# Patient Record
Sex: FEMALE | Race: WHITE | NOT HISPANIC OR LATINO | Employment: OTHER | URBAN - METROPOLITAN AREA
[De-identification: names, ages, dates, MRNs, and addresses within clinical notes are randomized per-mention and may not be internally consistent; named-entity substitution may affect disease eponyms.]

---

## 2024-07-18 PROBLEM — F32.A DEPRESSION: Status: ACTIVE | Noted: 2024-07-18

## 2024-07-18 PROBLEM — L21.0 DANDRUFF: Status: ACTIVE | Noted: 2024-07-18

## 2024-07-18 PROBLEM — G89.29 CHRONIC LOW BACK PAIN: Status: ACTIVE | Noted: 2024-07-18

## 2024-07-18 PROBLEM — E11.9 DIABETES MELLITUS TYPE 2, CONTROLLED (MULTI): Status: ACTIVE | Noted: 2024-07-18

## 2024-07-18 PROBLEM — M54.50 CHRONIC LOW BACK PAIN: Status: ACTIVE | Noted: 2024-07-18

## 2024-07-18 PROBLEM — E55.9 VITAMIN D DEFICIENCY: Status: ACTIVE | Noted: 2024-07-18

## 2024-07-18 PROBLEM — N64.89 PSEUDOANGIOMATOUS STROMAL HYPERPLASIA OF BREAST: Status: ACTIVE | Noted: 2024-07-18

## 2024-07-18 PROBLEM — R10.13 DYSPEPSIA: Status: ACTIVE | Noted: 2024-07-18

## 2024-07-18 PROBLEM — E78.5 HYPERLIPIDEMIA: Status: ACTIVE | Noted: 2024-07-18

## 2024-07-18 PROBLEM — M25.512 CHRONIC LEFT SHOULDER PAIN: Status: ACTIVE | Noted: 2024-07-18

## 2024-07-18 PROBLEM — M47.816 LUMBAR SPONDYLOSIS: Status: ACTIVE | Noted: 2024-07-18

## 2024-07-18 PROBLEM — R06.09 DOE (DYSPNEA ON EXERTION): Status: ACTIVE | Noted: 2024-07-18

## 2024-07-18 PROBLEM — R53.83 FATIGUE: Status: ACTIVE | Noted: 2024-07-18

## 2024-07-18 PROBLEM — M17.0 PRIMARY OSTEOARTHRITIS OF BOTH KNEES: Status: ACTIVE | Noted: 2024-07-18

## 2024-07-18 PROBLEM — R79.89 LOW VITAMIN B12 LEVEL: Status: ACTIVE | Noted: 2024-07-18

## 2024-07-18 PROBLEM — M79.671 PAIN OF RIGHT HEEL: Status: RESOLVED | Noted: 2018-12-13 | Resolved: 2024-07-18

## 2024-07-18 PROBLEM — M19.90 ARTHRITIS: Status: ACTIVE | Noted: 2024-07-18

## 2024-07-18 PROBLEM — Z98.84 S/P GASTRIC BYPASS: Status: RESOLVED | Noted: 2024-07-18 | Resolved: 2024-07-18

## 2024-07-18 PROBLEM — E66.01 MORBID OBESITY (MULTI): Status: ACTIVE | Noted: 2024-07-18

## 2024-07-18 PROBLEM — G89.29 CHRONIC LEFT SHOULDER PAIN: Status: ACTIVE | Noted: 2024-07-18

## 2024-07-18 RX ORDER — ATORVASTATIN CALCIUM 80 MG/1
1 TABLET, FILM COATED ORAL DAILY
COMMUNITY
Start: 2016-07-20

## 2024-07-18 RX ORDER — BLOOD-GLUCOSE METER
EACH MISCELLANEOUS
COMMUNITY
Start: 2017-02-14

## 2024-07-18 RX ORDER — HYDROCHLOROTHIAZIDE 25 MG/1
1 TABLET ORAL DAILY
COMMUNITY
Start: 2016-04-09

## 2024-07-18 RX ORDER — FERROUS SULFATE 325(65) MG
1 TABLET ORAL DAILY
COMMUNITY
Start: 2016-11-30

## 2024-07-18 RX ORDER — LANOLIN ALCOHOL/MO/W.PET/CERES
1 CREAM (GRAM) TOPICAL DAILY
COMMUNITY
Start: 2021-11-16

## 2024-07-18 RX ORDER — KETOCONAZOLE 20 MG/ML
SHAMPOO, SUSPENSION TOPICAL
COMMUNITY
Start: 2022-07-29

## 2024-07-18 RX ORDER — OMEPRAZOLE 20 MG/1
1 CAPSULE, DELAYED RELEASE ORAL DAILY
COMMUNITY
Start: 2021-03-04

## 2024-07-18 RX ORDER — GLIPIZIDE 10 MG/1
1 TABLET ORAL 2 TIMES DAILY
COMMUNITY
Start: 2016-04-09

## 2024-07-18 RX ORDER — FLUOXETINE HYDROCHLORIDE 20 MG/1
CAPSULE ORAL
COMMUNITY
Start: 2016-07-17

## 2024-07-18 RX ORDER — ACETAMINOPHEN 500 MG
1 TABLET ORAL DAILY
COMMUNITY
Start: 2022-04-14

## 2024-07-18 RX ORDER — METFORMIN HYDROCHLORIDE 1000 MG/1
1 TABLET ORAL 2 TIMES DAILY
COMMUNITY
Start: 2016-04-09

## 2024-07-18 RX ORDER — DULAGLUTIDE 3 MG/.5ML
INJECTION, SOLUTION SUBCUTANEOUS
COMMUNITY
Start: 2020-11-02

## 2024-07-18 RX ORDER — DAPAGLIFLOZIN 10 MG/1
1 TABLET, FILM COATED ORAL DAILY
COMMUNITY
Start: 2017-12-07

## 2024-07-18 RX ORDER — ALBUTEROL SULFATE 90 UG/1
AEROSOL, METERED RESPIRATORY (INHALATION)
COMMUNITY
Start: 2016-07-20

## 2024-07-18 RX ORDER — LISINOPRIL 20 MG/1
1 TABLET ORAL DAILY
COMMUNITY
Start: 2016-07-17

## 2024-08-20 ENCOUNTER — OFFICE VISIT (OUTPATIENT)
Dept: NEUROLOGY | Facility: CLINIC | Age: 64
End: 2024-08-20
Payer: COMMERCIAL

## 2024-08-20 ENCOUNTER — APPOINTMENT (OUTPATIENT)
Dept: GERIATRIC MEDICINE | Facility: CLINIC | Age: 64
End: 2024-08-20
Payer: COMMERCIAL

## 2024-08-20 VITALS
TEMPERATURE: 99 F | HEART RATE: 71 BPM | WEIGHT: 278.6 LBS | DIASTOLIC BLOOD PRESSURE: 90 MMHG | BODY MASS INDEX: 47.82 KG/M2 | RESPIRATION RATE: 20 BRPM | SYSTOLIC BLOOD PRESSURE: 165 MMHG

## 2024-08-20 DIAGNOSIS — F01.518 VASCULAR DEMENTIA WITH OTHER BEHAVIORAL DISTURBANCE, UNSPECIFIED DEMENTIA SEVERITY (MULTI): Primary | ICD-10-CM

## 2024-08-20 DIAGNOSIS — I67.9 CEREBROVASCULAR DISEASE: ICD-10-CM

## 2024-08-20 PROCEDURE — 99205 OFFICE O/P NEW HI 60 MIN: CPT | Performed by: PSYCHIATRY & NEUROLOGY

## 2024-08-20 PROCEDURE — 4010F ACE/ARB THERAPY RXD/TAKEN: CPT | Performed by: PSYCHIATRY & NEUROLOGY

## 2024-08-20 PROCEDURE — 3077F SYST BP >= 140 MM HG: CPT | Performed by: PSYCHIATRY & NEUROLOGY

## 2024-08-20 PROCEDURE — 3080F DIAST BP >= 90 MM HG: CPT | Performed by: PSYCHIATRY & NEUROLOGY

## 2024-08-20 PROCEDURE — 1036F TOBACCO NON-USER: CPT | Performed by: PSYCHIATRY & NEUROLOGY

## 2024-08-20 PROCEDURE — 99215 OFFICE O/P EST HI 40 MIN: CPT | Performed by: PSYCHIATRY & NEUROLOGY

## 2024-08-20 RX ORDER — FLUOXETINE HYDROCHLORIDE 20 MG/1
20 CAPSULE ORAL ONCE
Status: SHIPPED | OUTPATIENT
Start: 2024-08-20

## 2024-08-20 ASSESSMENT — ACTIVITIES OF DAILY LIVING (ADL)
STILL_DRIVING: NO
GROCERY_SHOPPING: INDEPENDENT
USING_TRANSPORTATION: INDEPENDENT
USING_TELEPHONE: NEEDS ASSISTANCE
TAKING_MEDICATION: INDEPENDENT
PILL_BOX_USED: YES
PREPARING_MEALS: INDEPENDENT
NEEDS_ASSISTANCE_WITH_FOOD: INDEPENDENT
EATING: INDEPENDENT
DOING_HOUSEWORK: INDEPENDENT
MANAGING_FINANCES: NEEDS ASSISTANCE
BATHING_ASSISTANCE: MINIMAL

## 2024-08-20 ASSESSMENT — MINI MENTAL STATE EXAM
PLACE DESIGN, ERASER AND PENCIL IN FRONT OF THE PERSON.  SAY:  COPY THIS DESIGN PLEASE.  SHOW: DESIGN. ALLOW: MULTIPLE TRIES. WAIT UNTIL PERSON IS FINISHED AND HANDS IT BACK. SCORE: ONLY FOR DIAGRAM WITH 4-SIDED FIGURE BETWEEN TWO 5-SIDED FIGURES: 1 CORRECT
RECALL THE 3 OBJECTS FROM ABOVE (APPLE, TABLE, PENNY) OR (BALL, TREE, FLAG): 0 CORRECT / UNABLE TO SCORE
SPELL THE WORD WORLD FORWARD AND BACKWARDS OR SERIAL 7S: 2 CORRECT
NAME OR REPEAT 3 OBJECTS - (APPLE, TABLE, PENNY) OR (BALL, TREE, FLAG): 3 CORRECT
SAY:  READ THE WORDS ON THE PAGE AND THEN DO WHAT IT SAYS.  THEN HAND THE PERSON THE SHEET WITH CLOSE YOUR EYES ON IT.  IF THE SUBJECT READS AND DOES NOT CLOSE THEIR EYES, REPEAT UP TO THREE TIMES.  SCORE ONLY IF SUBJECT CLOSES EYES.: 3 CORRECT
WHAT IS THE YEAR, SEASON, DATE, DAY, AND MONTH: 3 CORRECT
WHAT STATE, COUNTRY, CITY, HOSPITAL, FLOOR: 4 CORRECT
HAND THE PERSON A PENCIL AND PAPER. SAY:  WRITE ANY COMPLETE SENTENCE ON THAT PIECE OF PAPER. (NOTE: THE SENTENCE MUST MAKE SENSE.  IGNORE SPELLING ERRORS): 1 CORRECT
SUM ALL MMSE QUESTIONS FOR TOTAL SCORE [OUT OF 30].: 20
SAY: I WOULD LIKE YOU TO REPEAT THIS PHRASE AFTER ME: NO IFS, ANDS, OR BUTS.: 1 CORRECT
SHOW: PENCIL [OBJECT] ASK: WHAT IS THIS CALLED?: 2 CORRECT
PLEASE COPY THIS PICTURE (NOTE ALL 10 ANGLES MUST BE PRESENT AND TWO MUST INTERSECT): 0 CORRECT

## 2024-08-20 ASSESSMENT — GERIATRIC DEPRESSION SCALE SHORT VERSION (GDS-SV)
DO YOU OFTEN GET BORED: NO
DO YOU FEEL YOU HAVE MORE PROBLEMS WITH MEMORY THAN MOST: YES
ARE YOU BASICALLY SATISFIED WITH YOUR LIFE: NO
DO YOU PREFER TO STAY AT HOME, RATHER THAN GOING OUT AND DOING NEW THINGS: NO
HAVE YOU DROPPED MANY OF YOUR ACTIVITIES AND INTERESTS?: YES
DO YOU FEEL THAT YOUR SITUATION IS HOPELESS: YES
GDS TOTAL SCORE: 6
DO YOU FEEL FULL OF ENERGY: NO
DO YOU OFTEN FEEL HELPLESS: NO
DO YOU FEEL THAT YOUR LIFE IS EMPTY: NO
ARE YOU IN GOOD SPIRITS MOST OF THE TIME: YES
DO YOU THINK THAT MOST PEOPLE ARE BETTER OFF THAN YOU ARE: NO
ARE YOU AFRAID THAT SOMETHING BAD IS GOING TO HAPPEN TO YOU: NO
DO YOU FEEL PRETTY WORTHLESS THE WAY YOU ARE NOW: NO
DO YOU FEEL HAPPY MOST OF THE TIME: NO
DO YOU THINK IT IS WONDERFUL TO BE ALIVE NOW: YES

## 2024-08-20 ASSESSMENT — ENCOUNTER SYMPTOMS
LOSS OF SENSATION IN FEET: 0
DEPRESSION: 0
OCCASIONAL FEELINGS OF UNSTEADINESS: 0

## 2024-08-20 ASSESSMENT — PATIENT HEALTH QUESTIONNAIRE - PHQ9
2. FEELING DOWN, DEPRESSED OR HOPELESS: NOT AT ALL
1. LITTLE INTEREST OR PLEASURE IN DOING THINGS: NOT AT ALL
SUM OF ALL RESPONSES TO PHQ9 QUESTIONS 1 AND 2: 0

## 2024-08-20 ASSESSMENT — PAIN SCALES - GENERAL: PAINLEVEL: 0-NO PAIN

## 2024-08-20 NOTE — PROGRESS NOTES
Patient ID: Doreen Napier is a 64 y.o. female who presents for cognitive impairment, sister made pt's appointment.    Identifying Information                              : 1960           Assessment date: 2024    Patient accompanied by:  Sister Phylicia         History provided by: Patient and sister    Symptoms Reported (include length of time): For the past couple years, pt has been losing a lot of her words. Pt's confused in 2024, pt went to the ER in 2024 and found out that she had a UTI. She is forgetting how to use her phone, trouble remembering what is being told to her and has to write everything down. A couple months after alf, the memory impairment started. Had gastric bypass >10 years ago.     CONCERNS IDENTIFIED BY NURSING AND SOCIAL WORK (Safety Risks, Health Issues, POA not in Chart, etc)     1. Pt had a UTI for months without knowing it. Has urinary urgency.     2. Has a CPAP but does not use it.     3. Still driving      4. Sister has to remind pt when to bathe.     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      nursing home date (if applicable): retired , no memory issues prior to alf     On any form of disability? no If so, explain:   Marital Status:  single                       Social History     Socioeconomic History    Marital status: Single   Tobacco Use    Smoking status: Never    Smokeless tobacco: Never        ENCOUNTER SCREENING RESULTS     MMSE (Mini Mental State Exam)  What is the Year, Season, Date, Day, and Month?: 3 correct  Where are we: State, Country, City, Hospital, Floor?: 4 correct  Name / Repeat 3 objects:( Apple, Table, Mila) or (Ball, Tree, Flag) : 3 correct  Serial 7's backwards or spell World backwards?: 2 correct (could not do the serial sevens, got 2 letters correct spelling world backwards)  Recall the 3 objects from above  "(apple, table, daniel) or (ball, tree, flag): 0 correct / Unable to score  Name the following: pencil, watch: 2 correct  Repeat the following: \"No ifs, ands, or buts.\": 1 correct  Follow these commands: Take a paper in your right hand, fold it in half, and put it on the floor.: 3 correct  \"Please read this and do what it says\" (Written instruction is \"Close your eyes.\"): 1 correct  Written instruction is \"Make up and write a sentence about anything.\" (This sentence must contain a noun and a verb.): 1 correct  Written instruction is: \"Please copy this picture.\" (All 10 angles must be present and two must intersect.): 0 correct  MMSE Total Score:: 20    Geriatric Depression Scale (Short Version) Total: 6    NURSING ASSESSMENT          IADL's  Using Telephone: Needs assistance (sometimes has trouble with remembering what the buttons are for, retrieving messages, navigating the phone)  Grocery Shopping: Independent  Preparing Meals: Independent  Doing Housework: Independent  Laundry: Independent  Taking Medication: Independent  Pill Box Used: Yes  Managing Finances: Needs assistance (has a history of forgetting about paying bills, sister took over, now bills are automatically withdrawn.)  Using Transportation: Independent  Still Driving: No (knows what areas look like, but does not know the name of streets, drives locally)  Eating: Independent  Needs Assistance With Food: Independent  Difficulty Chewing or Swallowing: No     Safety Concerns  Safety Concerns: None     Nutrition and Exercise  Current Diet: Well Balanced Diet  Adequate Fluid Intake: Yes  Caffeine: Yes (24 oz cup of coke a day)  Appetite:: Good  Food Consistency:: Regular (had gastric bypass, watches sugar intake)  Liquids Consistency:: Thin  Changes in Weight?: Yes (lost 30 pounds while on Trulicity in 2022)  Chewing or Swallowing Problems?: No  Exercise Frequency: Regularly     SOCIAL WORK ASSESSMENT    Advance Directives/Legal/Financial    Patient " Healthcare POA:  no         Is copy of Healthcare POA in chart:  no       DPOA for finances:  no        Legal guardian:  NA     Living Will: no     ? no  If so, explain:     Significant financial stressors:     Family History      Biological Mother(status, age at death, cause of death): Mom  in  at age 82, from Alzheimer's, but  from not recovering from fall.      Biological Father(status, age at death, cause of death): Dad  at age 68 from complications from heart and blood sugar problems.      Biological Siblings(status, age at death, cause of death): 2 sisters both living     Extended family members with relevant health history:     Living Situation      Type of residence:     People living in the home:    Supportive Relationships (Informal Support)     Spouse/partner information (include length of relationship, health status):      Children Information (include location, level of engagement): none      Other social supports:    Formal Supports     Engaged community services (Emergency Alert, HHA, MOW, Case Management, Etc.): NA    Mental Health     Sleep: sleeps good, goes to bed at 9pm-11pm, wakes up at 7am-10am, has a CPAP but does not use it.      Energy: tires out throughout the day, takes naps     Mood: within normal limits     Affect: calm and pleasant     Notable Loss/Grief: NA     Self-harm/suicidal thoughts, plan: None Reported     Interests/Hobbies/Activities/Daily Routine: goes to the library, visits with friends, Taoist activities, takes a friend to run errands     History of outpatient psychiatric services: NA     History of inpatient psychiatric services (hospitalization): NA     Medications for mental health past or present: NA     History of addiction services: NA    Additional Nursing Notes or Follow-up Matters: NA    Additional Social Work Notes or Follow-up Matters: NA

## 2024-08-26 ENCOUNTER — TELEPHONE (OUTPATIENT)
Dept: NEUROLOGY | Facility: CLINIC | Age: 64
End: 2024-08-26

## 2024-08-26 DIAGNOSIS — I67.9 CEREBROVASCULAR DISEASE: ICD-10-CM

## 2024-08-26 DIAGNOSIS — F01.518 VASCULAR DEMENTIA WITH OTHER BEHAVIORAL DISTURBANCE, UNSPECIFIED DEMENTIA SEVERITY: ICD-10-CM

## 2024-08-26 RX ORDER — FLUOXETINE HYDROCHLORIDE 20 MG/1
20 CAPSULE ORAL DAILY
Qty: 90 CAPSULE | Refills: 3 | OUTPATIENT
Start: 2024-08-26 | End: 2025-08-26

## 2024-09-10 ENCOUNTER — TELEPHONE (OUTPATIENT)
Dept: NEUROLOGY | Facility: CLINIC | Age: 64
End: 2024-09-10
Payer: COMMERCIAL

## 2024-09-10 DIAGNOSIS — F32.A DEPRESSION, UNSPECIFIED DEPRESSION TYPE: Primary | ICD-10-CM

## 2024-09-10 RX ORDER — FLUOXETINE HYDROCHLORIDE 20 MG/1
20 CAPSULE ORAL DAILY
Qty: 90 CAPSULE | Refills: 3 | Status: SHIPPED | OUTPATIENT
Start: 2024-09-10 | End: 2025-09-10

## 2025-01-21 ENCOUNTER — APPOINTMENT (OUTPATIENT)
Dept: NEUROLOGY | Facility: CLINIC | Age: 65
End: 2025-01-21
Payer: COMMERCIAL

## 2025-01-29 ENCOUNTER — OFFICE VISIT (OUTPATIENT)
Dept: NEUROLOGY | Facility: CLINIC | Age: 65
End: 2025-01-29
Payer: COMMERCIAL

## 2025-01-29 VITALS
RESPIRATION RATE: 18 BRPM | DIASTOLIC BLOOD PRESSURE: 75 MMHG | WEIGHT: 273 LBS | HEART RATE: 69 BPM | SYSTOLIC BLOOD PRESSURE: 148 MMHG | BODY MASS INDEX: 46.86 KG/M2

## 2025-01-29 DIAGNOSIS — F01.518 VASCULAR DEMENTIA WITH OTHER BEHAVIORAL DISTURBANCE, UNSPECIFIED DEMENTIA SEVERITY: ICD-10-CM

## 2025-01-29 DIAGNOSIS — F02.80 LATE ONSET ALZHEIMER'S DISEASE WITHOUT BEHAVIORAL DISTURBANCE (MULTI): Primary | ICD-10-CM

## 2025-01-29 DIAGNOSIS — G30.1 LATE ONSET ALZHEIMER'S DISEASE WITHOUT BEHAVIORAL DISTURBANCE (MULTI): Primary | ICD-10-CM

## 2025-01-29 PROCEDURE — 3077F SYST BP >= 140 MM HG: CPT | Performed by: PSYCHIATRY & NEUROLOGY

## 2025-01-29 PROCEDURE — 4010F ACE/ARB THERAPY RXD/TAKEN: CPT | Performed by: PSYCHIATRY & NEUROLOGY

## 2025-01-29 PROCEDURE — 99215 OFFICE O/P EST HI 40 MIN: CPT | Performed by: PSYCHIATRY & NEUROLOGY

## 2025-01-29 PROCEDURE — 3078F DIAST BP <80 MM HG: CPT | Performed by: PSYCHIATRY & NEUROLOGY

## 2025-01-29 RX ORDER — LOSARTAN POTASSIUM 25 MG/1
1 TABLET ORAL
COMMUNITY
Start: 2025-01-06

## 2025-01-29 RX ORDER — MEMANTINE HYDROCHLORIDE 10 MG/1
10 TABLET ORAL 2 TIMES DAILY
Qty: 180 TABLET | Refills: 3 | Status: SHIPPED | OUTPATIENT
Start: 2025-01-29 | End: 2026-01-29

## 2025-01-29 ASSESSMENT — PAIN SCALES - GENERAL: PAINLEVEL_OUTOF10: 0-NO PAIN

## 2025-01-29 NOTE — PROGRESS NOTES
[place], [place]      Department of Neurology  [facility]     [facility] Consultation    PCP:  Dr. Nikolas Cobb        2025  Re: Doreen Napier     : 1960  MRN [id number]    CC: 63yo woman referred for the evaluation of mild cognitive impairment.  Info from pt, her sister Phylicia, and the EMR.  HPI: :  Pt noticed difficulty finding words and memory impairment, not directly prompting her early prison.  :  Forgetting how to do everyday tasks around the house.  : Worsened in March and ER eval dxd UTI.  UTI was treated but memory did not improve.  Lives alone in apartment, recently cleaned by family.  Sister reminds her to take baths.  Pt continues to drive, remembers what streets look like but can't remember the names.  She understands what others say, but can't think of what she wants to say.  Symptoms worsened during UTI, EW dx. :  Pts' sister tells me that pt is having trouble remembering the appt schedule.  She renewed her 's license, passed the test and was re-issued license. UTI in  worsened word-finding.  This appt was occupied explaining that we need the MRI done at a  hosp ([facility] main hosp MRI) and the amyloid PET scan and then why it was never sent to  radiology for posting in our system as they had specifically requested at [facility].  Med Hx  Allergies: ASA, NSAIDs, both noted in the course or consequence, of gastric bypass.  Rx:   lhtafhflrx71 (shifted from AM to hs), hctz25, aeazjpxstj10, oaubnmp41, felllfzes2spWT,   pahteomwt97yux, tolvbelwf5yoyk, ajbclzrw32IU,    albuterol inh, FeSO4, B12, D3, ketoconazole shampoo, cataracts,  CPAP  H/O: anx-depress, trigem neural, HTN, HLD, MELTON, DM2, arthritis, glaucoma, skin neoplasia, RADHA  S/P: gastric bypass, recently Rxd trulicity and lost another 25 lbs  FHx: Dad DM d68; Mom d82 AD,  2 sis , none kids  PSHx: Marital: single, in [facility], college grad, [employer], still drives, sleep wnl  EtOH:  none Smoking: none      Subs Abuse: none       Physical: MO=731/75, HR=69, RR=18,   prev ls=178  Neuro MS: Affect: a bit anxious during discussion    Otherwise wnl range       no hallucins, delusions, or agitation  CN:  FEOM  w/o nystagmus or ptosis   Motor: strong w/o UE  wnl tone  I did not see a tremor today    Gait: nl pace, stride, armswing  wnl turning in 3.0 steps   Cog Exam: Language:  RH, recept: answers & follows wnl      Prev Testing MoCA=20/30, c/w mild-mod impairment   AD8=6/7  Labs (to 2-):  wbc=5.8,    hct=42.9,    cwg=910  A1c=7.5à6.8  mwa=222, bun/crt=20/0.89,  GFR=73,     AST=19  ALT=24  AlkP=75   bzcb=763, hdl=40.3, wdqXLO=592, kdu=453, chol/hdl=5.5, vldl=45, JU=730,  Xdsjreew=272     TSH=     fT4=   B1=     E69=072,  Fol=,  D3=26    Vj=395, %sat=32  EKG (7-):  NSR=70     LCk=129   Abnls:    Brain MRI (pend from 8-):  A&P: Hx: 2y declineàretired.  Genl exam: chronic over-weight.  Neuro exam: LE sensory loss.  Cog exam some struggle for words, other times fast/fluent/articulate.  Prev MoCA=20/30 c/w mod impair.  AD8=6/7 c/w some difficulties at home, as sister describes.  Labs available are unremarkable.  Interpret: Pt with two years of slowly progressive difficulty in naming common objects, carrying-on a conversation and saying words that she knows or usually knows. Her irregular responses and clearly stressed demeanor suggest that a mood disorder might play a role, regla in the context of her initially impending, and not recent, nursing home undermining the structure of her activities. Plan: I again ordered a brain MRI w/o contract to evaluate for vascular lesions, atrophy or other causes of cognitive decline.  She has been taking moxtdwkxoc24 w/o SEs and some benefit, increased dose to 20 mg/d in the hope of boosting her activity but it was not tolerated and dose reduced back to 10hs.  F/U:  I've again ordered the Brain MRI and added an  Amyloid PET scan and ApoE genotyping for AD dx and consider AD Mab tx.  They asked questions and showed good understanding.  I will RTC 2mons.   Thank you for allowing me to participate in your care.   Sincerely yours, Allen Espitia M.D., Ph.D.

## 2025-02-18 ENCOUNTER — HOSPITAL ENCOUNTER (OUTPATIENT)
Dept: RADIOLOGY | Facility: HOSPITAL | Age: 65
Discharge: HOME | End: 2025-02-18
Payer: COMMERCIAL

## 2025-02-18 DIAGNOSIS — F02.80 LATE ONSET ALZHEIMER'S DISEASE WITHOUT BEHAVIORAL DISTURBANCE (MULTI): ICD-10-CM

## 2025-02-18 DIAGNOSIS — I67.9 CEREBROVASCULAR DISEASE: ICD-10-CM

## 2025-02-18 DIAGNOSIS — G30.1 LATE ONSET ALZHEIMER'S DISEASE WITHOUT BEHAVIORAL DISTURBANCE (MULTI): ICD-10-CM

## 2025-02-18 PROCEDURE — A9586 FLORBETAPIR F18: HCPCS | Performed by: PSYCHIATRY & NEUROLOGY

## 2025-02-18 PROCEDURE — 3430000001 HC RX 343 DIAGNOSTIC RADIOPHARMACEUTICALS: Performed by: PSYCHIATRY & NEUROLOGY

## 2025-02-18 PROCEDURE — 70551 MRI BRAIN STEM W/O DYE: CPT

## 2025-02-18 PROCEDURE — 70551 MRI BRAIN STEM W/O DYE: CPT | Performed by: RADIOLOGY

## 2025-02-18 PROCEDURE — 78814 PET IMAGE W/CT LMTD: CPT | Mod: PI

## 2025-02-18 RX ADMIN — FLORBETAPIR F 18 11.47 MILLICURIE: 51 INJECTION, SOLUTION INTRAVENOUS at 13:15

## 2025-02-24 ENCOUNTER — APPOINTMENT (OUTPATIENT)
Dept: NEUROLOGY | Facility: CLINIC | Age: 65
End: 2025-02-24
Payer: COMMERCIAL

## 2025-06-09 ENCOUNTER — APPOINTMENT (OUTPATIENT)
Dept: NEUROLOGY | Facility: CLINIC | Age: 65
End: 2025-06-09
Payer: COMMERCIAL

## 2025-06-09 ENCOUNTER — HOME HEALTH ADMISSION (OUTPATIENT)
Dept: HOME HEALTH SERVICES | Facility: HOME HEALTH | Age: 65
End: 2025-06-09
Payer: COMMERCIAL

## 2025-06-09 VITALS
WEIGHT: 271.4 LBS | TEMPERATURE: 97.3 F | DIASTOLIC BLOOD PRESSURE: 92 MMHG | BODY MASS INDEX: 46.33 KG/M2 | HEIGHT: 64 IN | HEART RATE: 78 BPM | SYSTOLIC BLOOD PRESSURE: 140 MMHG | RESPIRATION RATE: 16 BRPM

## 2025-06-09 DIAGNOSIS — R26.81 UNSTEADY GAIT: ICD-10-CM

## 2025-06-09 DIAGNOSIS — E11.10: ICD-10-CM

## 2025-06-09 DIAGNOSIS — G23.8 OTHER SPECIFIED DEGENERATIVE DISEASES OF BASAL GANGLIA: ICD-10-CM

## 2025-06-09 DIAGNOSIS — R41.0 CONFUSION: Primary | ICD-10-CM

## 2025-06-09 DIAGNOSIS — G30.1 LATE ONSET ALZHEIMER'S DISEASE WITHOUT BEHAVIORAL DISTURBANCE (MULTI): ICD-10-CM

## 2025-06-09 DIAGNOSIS — F02.80 LATE ONSET ALZHEIMER'S DISEASE WITHOUT BEHAVIORAL DISTURBANCE (MULTI): ICD-10-CM

## 2025-06-09 PROCEDURE — 99205 OFFICE O/P NEW HI 60 MIN: CPT | Performed by: PSYCHIATRY & NEUROLOGY

## 2025-06-09 PROCEDURE — 3008F BODY MASS INDEX DOCD: CPT | Performed by: PSYCHIATRY & NEUROLOGY

## 2025-06-09 PROCEDURE — 3077F SYST BP >= 140 MM HG: CPT | Performed by: PSYCHIATRY & NEUROLOGY

## 2025-06-09 PROCEDURE — 4010F ACE/ARB THERAPY RXD/TAKEN: CPT | Performed by: PSYCHIATRY & NEUROLOGY

## 2025-06-09 PROCEDURE — 1159F MED LIST DOCD IN RCRD: CPT | Performed by: PSYCHIATRY & NEUROLOGY

## 2025-06-09 PROCEDURE — 1160F RVW MEDS BY RX/DR IN RCRD: CPT | Performed by: PSYCHIATRY & NEUROLOGY

## 2025-06-09 PROCEDURE — G8433 SCR FOR DEP NOT CPT DOC RSN: HCPCS | Performed by: PSYCHIATRY & NEUROLOGY

## 2025-06-09 PROCEDURE — 3080F DIAST BP >= 90 MM HG: CPT | Performed by: PSYCHIATRY & NEUROLOGY

## 2025-06-09 RX ORDER — MEMANTINE HYDROCHLORIDE 10 MG/1
10 TABLET ORAL 2 TIMES DAILY
Qty: 180 TABLET | Refills: 3 | Status: SHIPPED | OUTPATIENT
Start: 2025-06-09 | End: 2026-06-09

## 2025-06-09 ASSESSMENT — ENCOUNTER SYMPTOMS
CONFUSION: 1
OCCASIONAL FEELINGS OF UNSTEADINESS: 0
LOSS OF SENSATION IN FEET: 0
DEPRESSION: 0

## 2025-06-09 NOTE — PATIENT INSTRUCTIONS
Plan: The patient does not meet criteria for treatment with either the Alzheimer's infusions at this time as her Mini-Mental status examination score was 9.  It is certainly possible that it is lower than just this past winter as she may have a urinary tract infection.  The patient needs a urinalysis and a urine culture.  The patient needs a CMP and CBC.  The patient needs a workup for the reversible cause of dementia.  The patient does need an Occupational Therapy evaluation for driving evaluation.  The patient needs a home health evaluation as well as a physical and Occupational Therapy consult.  The patient should continue all of her medicines and stroke risk factor modification.  The patient's sister does need to get hardin of  for finances and the patient needs a living well.  The patient's blood pressure is mildly elevated and she needs to follow-up with the primary care doctor for this.  I discussed all these issues in detail with the patient and her sister and answered all the questions.  The patient will follow up with the NP in 6 months.        Ways to Help Prevent Falls at Home    Quick Tips   ? Ask for help if you need it. Most people want to help!   ? Get up slowly after sitting or laying down   ? Wear a medical alert device or keep cell phone in your pocket   ? Use night lights, especially areas near a bathroom   ? Keep the items you use often within reach on a small stool or end table   ? Use an assistive device such as walker or cane, as directed by provider/physical therapy   ? Use a non-slip mat and grab bars in your bathroom. Look for home health sections for best options     Other Areas to Focus On   ? Exercise and nutrition: Regular exercise or taking a falls prevention class are great ways improve strength and balance. Don’t forget to stay hydrated and bring a snack!   ? Medicine side effects: Some medicines can make you sleepy or dizzy, which could cause a fall. Ask your healthcare  provider about the side effects your medicines could cause. Be sure to let them know if you take any vitamins or supplements as well.   ? Tripping hazards: Remove items you could trip on, such as loose mats, rugs, cords, and clutter. Wear closed toe shoes with rubber soles.   ? Health and wellness: Get regular checkups with your healthcare provider, plus routine vision and hearing screenings. Talk with your healthcare provider about:   o Your medicines and the possible side effects - bring them in a bag if that is easier!   o Problems with balance or feeling dizzy   o Ways to promote bone health, such as Vitamin D and calcium supplements   o Questions or concerns about falling     *Ask your healthcare team if you have questions     ©[facility], 2022

## 2025-06-09 NOTE — PROGRESS NOTES
Subjective     Doreen Karthikeyan 65 y.o.  HPI  The patient and her sister both attend the appointment today.  The patient's sister states that she has had a lot of difficulties with talking.  She is having difficulty finding words and she feels that she has been losing the concept of time and numbers.  Her sister feels that her short-term memory is poor but her long-term memory is relatively intact.  Her sister states that for a while the family was finishing her sentences and the patient was forgetful but then all of a sudden she became very confused.  The patient's family took her to the hospital and she was diagnosed with a urinary tract infection that was treated.  Her sister states that her confusion resolved but she was still having the other issues that she had had previously.  Her sister notes that whenever she gets urinary tract infection she gets more confused.  The patient currently lives alone and her sister helps her when she gets to her house.  The patient has been driving.    The patient was seen by Dr. Espitia and I did review his note.  He did order an MRI of the brain that showed some scattered, small hyperintensities on FLAIR in the subcortical and periventricular white matter with involvement of the brainstem.  There was prominent perivascular spaces and/or remote lacunar infarctions noted in the basal ganglia, thalami and external capsules.  There was parenchymal volume loss noted.  The patient's nuclear medicine CT amyloid PET scan was positive.  The patient had a B12 level in 2023 that was normal.  The patient's sister has power of  for healthcare but not finances and the patient has no living will.      The patient is compliant with her Lipitor, Prozac, Glucotrol, Cozaar and Namenda.    Review of Systems   Psychiatric/Behavioral:  Positive for confusion.    All other systems reviewed and are negative.       Problem List[1]     Medical History[2]     Surgical History[3]     Social History  "    Socioeconomic History   • Marital status: Single     Spouse name: Not on file   • Number of children: Not on file   • Years of education: Not on file   • Highest education level: Not on file   Occupational History   • Not on file   Tobacco Use   • Smoking status: Never     Passive exposure: Never   • Smokeless tobacco: Never   Vaping Use   • Vaping status: Never Used   Substance and Sexual Activity   • Alcohol use: Not Currently   • Drug use: Never   • Sexual activity: Not on file   Other Topics Concern   • Not on file   Social History Narrative   • Not on file     Social Drivers of Health     Financial Resource Strain: Not on file   Food Insecurity: Not on file   Transportation Needs: Not on file   Physical Activity: Not on file   Stress: Not on file   Social Connections: Not on file   Intimate Partner Violence: Not on file   Housing Stability: Not on file        Family History[4]     Current Outpatient Medications   Medication Instructions   • atorvastatin (Lipitor) 80 mg tablet 1 tablet, Daily   • blood sugar diagnostic (OneTouch Verio test strips) strip Test 1 (one) time daily as directed. Dx: E11.9   • FLUoxetine (PROZAC) 20 mg, oral, Daily   • glipiZIDE (Glucotrol) 10 mg tablet 1 tablet, 2 times daily   • losartan (Cozaar) 25 mg tablet 1 tablet, Daily (0630)   • memantine (NAMENDA) 10 mg, oral, 2 times daily        RX Allergies[5]     Objective  BP (!) 140/92 (BP Location: Left arm)   Pulse 78   Temp 36.3 °C (97.3 °F)   Resp 16   Ht 1.626 m (5' 4\")   Wt 123 kg (271 lb 6.4 oz)   BMI 46.59 kg/m²    GENERAL APPEARANCE:  No distress, alert and cooperative.      CARDIOVASCULAR: Regular, rate and rhythm, without murmur. No carotid bruits. Pulses +2 and equal in all extremities. No edema, or tenderness to palpation.     MENTAL STATE: The patient is alert and oriented x 1 with severe confusion.  The patient is able to remember 0 objects out of 3 at 5 minutes.  Attention span and concentration were normal. " Language testing was normal for comprehension, repetition, expression, and naming. Calculation was intact. The patient could correctly interpret a picture, and copy a diagram. General fund of knowledge was intact.  The patient's Mini-Mental status examination score was 9.     OPHTHALMOSCOPIC: The ophthalmoscopic exam normal. The fundi were well visualized with normal disc margins, clear vessels and vascular pulsations. No disc edema. The cup/disk ratio was not enlarged. No hemorrhages or exudates were present in the posterior segments that were visualized.      CRANIAL NERVES:  Cranial nerves were normal.      CN 2- Visual Acuity  OD: 20/20 (corrected)   OS: 20/20 (corrected); visual fields full to confrontation.      CN 3, 4, 6-  Pupils round, 4 mm in diameter, equally reactive to light. No ptosis. EOMs normal alignment, full range of movement, no nystagmus     CN 5- Facial sensation intact bilaterally. Normal corneal reflexes.      CN 7- Normal and symmetric facial strength. Nasolabial folds symmetric.     CN 8- Hearing intact to finger rub, whisper.      CN 9- Palate elevates symmetrically. Normal gag reflex.      CN 11- Normal strength of shoulder shrug and neck turning      CN 12- Tongue midline, with normal bulk and strength; no fasciculations.      MOTOR:  Motor exam was normal. Muscle bulk and tone were normal in both upper and lower extremities. Muscle strength was 5/5 in distal and proximal muscles in both upper and lower extremities. No fasciculations, tremor or other abnormal movements were present.      REFLEXES: The patient's brachioradialis, biceps, triceps, patella and gastrocnemii 1+ bilaterally.  Babinski: toes downgoing to plantar stimulation. No clonus, frontal release signs or other pathologic reflexes present.      SENSORY: Sensory exam was intact to light touch, sharp/dull, vibration and position sense in both UE and LE.      COORDINATION: SARAH were intact in upper and lower extremities.  In  UE- finger-nose-finger was intact and in LE- heel-to-shin was intact without dysmetria or overshoot.       GAIT: The patient station and gait are mildly unsteady.    Assessment/Plan   Impression: The patient is a 65-year-old female who has had memory issues for quite a while.  The patient was diagnosed with dementia and has been getting worse in terms of her cognitive functioning.  Her neurological examination is abnormal and noted above.  The patient is more confused than normal as per sister today and may have urinary tract infection.    Plan: The patient does not meet criteria for treatment with either the Alzheimer's infusions at this time as her Mini-Mental status examination score was 9.  It is certainly possible that it is lower than just this past winter as she may have a urinary tract infection.  The patient needs a urinalysis and a urine culture.  The patient needs a CMP and CBC.  The patient needs a workup for the reversible cause of dementia.  The patient does need an Occupational Therapy evaluation for driving evaluation.  The patient needs a home health evaluation as well as a physical and Occupational Therapy consult.  The patient should continue all of her medicines and stroke risk factor modification.  The patient's sister does need to get hardin of  for finances and the patient needs a living well.  The patient's blood pressure is mildly elevated and she needs to follow-up with the primary care doctor for this.  I discussed all these issues in detail with the patient and her sister and answered all the questions.  The patient will follow up with the NP in 6 months.         [1]  Patient Active Problem List  Diagnosis   • Depression   • Arthritis   • Chronic left shoulder pain   • Chronic low back pain   • Combined forms of age-related cataract of both eyes   • Dandruff   • Type 2 diabetes mellitus without complication, without long-term current use of insulin   • MELTON (dyspnea on exertion)    • Dyspepsia   • Fatigue   • Hyperlipidemia   • Hypertension   • Iron deficiency anemia   • Low vitamin B12 level   • Lumbar spondylosis   • Metabolic syndrome X   • Morbid obesity (Multi)   • Neoplasm of uncertain behavior of skin   • Nuclear senile cataract   • Obstructive sleep apnea syndrome   • Preglaucoma, unspecified, bilateral   • Primary osteoarthritis of both knees   • Pseudoangiomatous stromal hyperplasia of breast   • Severe obesity (BMI >= 40) (Multi)   • Trigeminal neuralgia   • Diabetes mellitus type 2, controlled (Multi)   • Vitamin D deficiency   • DM ketoacidosis type II, uncontrolled   [2]  Past Medical History:  Diagnosis Date   • Cervicalgia 11/16/2021    Acute neck pain   • Chronic cough 07/20/2016    Chronic cough   • Hx of intestinal bypass 10/01/2003    S/P BARIATRIC SURGERY {2001     • Other acute sinusitis 01/15/2020    Other acute sinusitis, recurrence not specified   • Pain in right finger(s) 06/11/2019    Bilateral thumb pain   • Pelvic and perineal pain 10/28/2020    Pelvic pressure in female   • Personal history of other diseases of the respiratory system 08/08/2018    History of acute bronchitis   • Personal history of other diseases of the respiratory system 08/08/2018    History of acute sinusitis   • Personal history of other endocrine, nutritional and metabolic disease 05/22/2019    History of hyperkalemia   • Personal history of other mental and behavioral disorders     History of depression   • Personal history of other specified conditions 05/22/2019    History of abnormal mammogram   • S/P gastric bypass 07/18/2024   • Sleep disorder, unspecified 07/24/2017    Disordered sleep   • Unspecified lump in unspecified breast 06/22/2017    Breast nodule   • Wheezing 01/15/2020    Wheezing on auscultation   [3]  Past Surgical History:  Procedure Laterality Date   • FOOT SURGERY  07/20/2016    Foot Surgery   • GALLBLADDER SURGERY  07/20/2016    Gallbladder Surgery   • GASTRIC BYPASS   07/20/2016    Gastric Surgery For Morbid Obesity Bypass With [place]-en-Y   • KNEE ARTHROSCOPY W/ DEBRIDEMENT  07/20/2016    Arthroscopy Knee   [4]  Family History  Problem Relation Name Age of Onset   • Alzheimer's disease Mother     • Stroke Father     [5]  Allergies  Allergen Reactions   • Nsaids (Non-Steroidal Anti-Inflammatory Drug) Unknown     HX BARIATIC SURGERY   • Aspirin Unknown     Gastric bypass   Patient was identified as a fall risk. Risk prevention instructions provided.

## 2025-06-10 PROBLEM — G23.8 OTHER SPECIFIED DEGENERATIVE DISEASES OF BASAL GANGLIA: Status: ACTIVE | Noted: 2025-06-10

## 2025-06-11 LAB
ALBUMIN SERPL-MCNC: 4.1 G/DL (ref 3.6–5.1)
ALP SERPL-CCNC: 86 U/L (ref 37–153)
ALT SERPL-CCNC: 41 U/L (ref 6–29)
ANION GAP SERPL CALCULATED.4IONS-SCNC: 11 MMOL/L (CALC) (ref 7–17)
AST SERPL-CCNC: 34 U/L (ref 10–35)
BASOPHILS # BLD AUTO: 50 CELLS/UL (ref 0–200)
BASOPHILS NFR BLD AUTO: 0.8 %
BILIRUB SERPL-MCNC: 0.9 MG/DL (ref 0.2–1.2)
BUN SERPL-MCNC: 17 MG/DL (ref 7–25)
CALCIUM SERPL-MCNC: 9 MG/DL (ref 8.6–10.4)
CHLORIDE SERPL-SCNC: 107 MMOL/L (ref 98–110)
CO2 SERPL-SCNC: 26 MMOL/L (ref 20–32)
CREAT SERPL-MCNC: 0.83 MG/DL (ref 0.5–1.05)
EGFRCR SERPLBLD CKD-EPI 2021: 78 ML/MIN/1.73M2
EOSINOPHIL # BLD AUTO: 107 CELLS/UL (ref 15–500)
EOSINOPHIL NFR BLD AUTO: 1.7 %
ERYTHROCYTE [DISTWIDTH] IN BLOOD BY AUTOMATED COUNT: 13.1 % (ref 11–15)
FOLATE SERPL-MCNC: 7.9 NG/ML
GLUCOSE SERPL-MCNC: 121 MG/DL (ref 65–99)
HCT VFR BLD AUTO: 44.1 % (ref 35–45)
HGB BLD-MCNC: 14.3 G/DL (ref 11.7–15.5)
LYMPHOCYTES # BLD AUTO: 1373 CELLS/UL (ref 850–3900)
LYMPHOCYTES NFR BLD AUTO: 21.8 %
MCH RBC QN AUTO: 30.4 PG (ref 27–33)
MCHC RBC AUTO-ENTMCNC: 32.4 G/DL (ref 32–36)
MCV RBC AUTO: 93.6 FL (ref 80–100)
METHYLMALONATE SERPL-SCNC: 145 NMOL/L (ref 69–390)
MONOCYTES # BLD AUTO: 529 CELLS/UL (ref 200–950)
MONOCYTES NFR BLD AUTO: 8.4 %
NEUTROPHILS # BLD AUTO: 4240 CELLS/UL (ref 1500–7800)
NEUTROPHILS NFR BLD AUTO: 67.3 %
PLATELET # BLD AUTO: 182 THOUSAND/UL (ref 140–400)
PMV BLD REES-ECKER: 11.2 FL (ref 7.5–12.5)
POTASSIUM SERPL-SCNC: 3.9 MMOL/L (ref 3.5–5.3)
PROT SERPL-MCNC: 6.5 G/DL (ref 6.1–8.1)
RBC # BLD AUTO: 4.71 MILLION/UL (ref 3.8–5.1)
SODIUM SERPL-SCNC: 144 MMOL/L (ref 135–146)
TSH SERPL-ACNC: 2.41 MIU/L (ref 0.4–4.5)
VIT B12 SERPL-MCNC: 809 PG/ML (ref 200–1100)
WBC # BLD AUTO: 6.3 THOUSAND/UL (ref 3.8–10.8)

## 2025-06-13 ENCOUNTER — DOCUMENTATION (OUTPATIENT)
Dept: HOME HEALTH SERVICES | Facility: HOME HEALTH | Age: 65
End: 2025-06-13
Payer: COMMERCIAL

## 2025-06-13 NOTE — HH CARE COORDINATION
Home Care received a referral for Physical Therapy and Occupational Therapy. Unfortunately, we are unable to accept and process the referral at this time.    Reason:  Insurance issue    Patients, please reach out to the referring provider or your PCP to assist in obtaining an alternative home care agency and/or guidance to meet your needs.    Providers, please reach out to  Home Care at [phone] with any questions regarding the declined referral.

## 2025-06-16 ENCOUNTER — APPOINTMENT (OUTPATIENT)
Dept: BEHAVIORAL HEALTH | Facility: CLINIC | Age: 65
End: 2025-06-16

## 2025-06-17 ENCOUNTER — TELEPHONE (OUTPATIENT)
Dept: NEUROLOGY | Facility: CLINIC | Age: 65
End: 2025-06-17
Payer: COMMERCIAL

## 2025-06-17 DIAGNOSIS — R41.0 CONFUSION: Primary | ICD-10-CM

## 2025-06-19 ENCOUNTER — PATIENT MESSAGE (OUTPATIENT)
Dept: NEUROLOGY | Facility: CLINIC | Age: 65
End: 2025-06-19
Payer: COMMERCIAL

## 2025-06-19 ENCOUNTER — TELEPHONE (OUTPATIENT)
Dept: PRIMARY CARE | Facility: CLINIC | Age: 65
End: 2025-06-19
Payer: COMMERCIAL

## 2025-06-19 DIAGNOSIS — N30.00 ACUTE CYSTITIS WITHOUT HEMATURIA: Primary | ICD-10-CM

## 2025-06-19 RX ORDER — NITROFURANTOIN 25; 75 MG/1; MG/1
100 CAPSULE ORAL 2 TIMES DAILY
Qty: 14 CAPSULE | Refills: 0 | Status: SHIPPED | OUTPATIENT
Start: 2025-06-19 | End: 2025-06-26

## 2025-06-19 NOTE — TELEPHONE ENCOUNTER
Per Dr Cobb :   Can you let pt's sister Phylicia  ([phone]) know her Neurologist contacted me with her urine results. Looks like she has a UTI. I sent an antibiotic in for her. She also hasn't seen me in 2.5 years so she should schedule a visit.

## 2025-06-19 NOTE — TELEPHONE ENCOUNTER
Can you let pt's sister Phylicia  ([phone]) know her Neurologist contacted me with her urine results. Looks like she has a UTI. I sent an antibiotic in for her. She also hasn't seen me in 2.5 years so she should schedule a visit.

## 2025-06-20 LAB
APPEARANCE UR: ABNORMAL
BACTERIA #/AREA URNS HPF: ABNORMAL /HPF
BACTERIA UR CULT: ABNORMAL
BACTERIA UR CULT: ABNORMAL
BILIRUB UR QL STRIP: NEGATIVE
COLOR UR: ABNORMAL
GLUCOSE UR QL STRIP: NEGATIVE
HGB UR QL STRIP: ABNORMAL
HYALINE CASTS #/AREA URNS LPF: ABNORMAL /LPF
KETONES UR QL STRIP: NEGATIVE
LEUKOCYTE ESTERASE UR QL STRIP: ABNORMAL
NITRITE UR QL STRIP: NEGATIVE
PH UR STRIP: 6.5 [PH] (ref 5–8)
PROT UR QL STRIP: ABNORMAL
RBC #/AREA URNS HPF: ABNORMAL /HPF
SERVICE CMNT-IMP: ABNORMAL
SP GR UR STRIP: 1.02 (ref 1–1.03)
SQUAMOUS #/AREA URNS HPF: ABNORMAL /HPF
WBC #/AREA URNS HPF: ABNORMAL /HPF